# Patient Record
Sex: MALE | Race: WHITE
[De-identification: names, ages, dates, MRNs, and addresses within clinical notes are randomized per-mention and may not be internally consistent; named-entity substitution may affect disease eponyms.]

---

## 2020-03-09 ENCOUNTER — HOSPITAL ENCOUNTER (EMERGENCY)
Dept: HOSPITAL 97 - ER | Age: 25
LOS: 1 days | Discharge: HOME | End: 2020-03-10
Payer: COMMERCIAL

## 2020-03-09 DIAGNOSIS — J40: Primary | ICD-10-CM

## 2020-03-09 PROCEDURE — 99284 EMERGENCY DEPT VISIT MOD MDM: CPT

## 2020-03-09 PROCEDURE — 87804 INFLUENZA ASSAY W/OPTIC: CPT

## 2020-03-09 PROCEDURE — 93005 ELECTROCARDIOGRAM TRACING: CPT

## 2020-03-09 PROCEDURE — 94640 AIRWAY INHALATION TREATMENT: CPT

## 2020-03-09 NOTE — XMS REPORT
Summary of Care

 Created on:2020



Patient:Dino Escobedo

Sex:Male

:1995

External Reference #:[id number]





Demographics







 Address  [address] [place] [address]

 

 Home Phone  [phone]

 

 Mobile Phone  [phone]

 

 Email Address  [email or web address]

 

 Email Address  [email or web address]

 

 Preferred Language  English

 

 Marital Status  

 

 Evangelical Affiliation  Unknown

 

 Race  White

 

 Ethnic Group  Not  or 









Author







 Organization  [facility]

 

 Address  [address] [place] [address]









Support







 Name  Relationship  Address  Phone

 

 Belkis Escobedo  Unavailable  Unavailable  [phone]









Care Team Providers







 Name  Role  Phone

 

 Vero Verduzco  Primary Care Provider  [phone]









Encounter Details







 Date  Type  Department  Care Team  Description

 

 2020  Hospital Encounter  [facility]  Vero Verduzco



  Canceled (ERROR)



     and Breast Imaging,  [address]



  



     floor



  [place] [address]



  [id number] [phone]  



       (Fax)  







Allergies

Not on Filedocumented as of this encounter (statuses as of 2020)



Medications

Not on filedocumented as of this encounter (statuses as of 2020)



Active Problems

Not on filedocumented as of this encounter (statuses as of 2020)



Social History







 Tobacco Use  Types  Packs/Day  Years Used  Date

 

 Never Assessed        









 Sex Assigned at Birth  Date Recorded

 

 Not on file  









 Job Start Date  Occupation  Industry

 

 Not on file  Not on file  Not on file









 Travel History  Travel Start  Travel End









 No recent travel history available.



documented as of this encounter



Last Filed Vital Signs

Not on filedocumented in this encounter



Plan of Treatment







 Health Maintenance  Due Date  Last Done  Comments

 

 VARICELLA VACCINES ( of  -  10/31/1994    



 2-dose childhood series)      

 

 DTaP,Tdap,and Td Vaccines ( -  10/31/2004    



 Tdap)      

 

 INFLUENZA VACCINE (#1)  2019    

 

 HPV VACCINES  Aged Out    No longer eligible based on



       patient's age to complete this



       topic

 

 PNEUMOCOCCAL 0-64 YEARS COMBINED  Aged Out    No longer eligible based on



 SERIES      patient's age to complete this



       topic



documented as of this encounter



Procedures







 Procedure Name  Priority  Date/Time  Associated Diagnosis  Comments

 

 XR SINUSES 3+ VW  Routine  2020  3:26 PM  Chronic maxillary  Results for 
this



     CST  sinusitis  procedure are in



         the results



         section.

 

 XR CHEST 2 VW  Routine  2020  3:26 PM  Shortness of breath  Results for 
this



     CST    procedure are in



         the results



         section.



documented in this encounter



Results

XR SINUSES 3+ VW (2020  3:26 PM CST)





 Specimen

 

 









 Narrative  Performed At

 

 This result has an attachment that is not available.



HISTORY: Chronic maxillary sinusitis.  PACS/VR/DOSE



   



 COMPARISON: None.  



   



 TECHNIQUE: AP, right lateral and Woo views are submitted for paranasal  



 sinus evaluation.  



   



 FINDINGS: All paranasal sinuses appear clear. No significant deviation of  



 the nasal septum noted. No gross pathology visualized in the temporal bone  



 anatomy or in the sella turcica region.  



   



 CONCLUSIONS: Clear paranasal sinuses.  



   









 Procedure Note

 

 [facility], Radiant Results Inft User - 2020  3:32 PM CST



HISTORY: Chronic maxillary sinusitis.



 



 COMPARISON: None.



 



 TECHNIQUE: AP, right lateral and Woo views are submitted for paranasal



 sinus evaluation.



 



 FINDINGS: All paranasal sinuses appear clear. No significant deviation of



 the nasal septum noted. No gross pathology visualized in the temporal bone



 anatomy or in the sella turcica region.



 



 CONCLUSIONS: Clear paranasal sinuses.









 Performing Organization  Address  City/State/Zipcode  Phone Number

 

 PACS/VR/DOSE      



XR CHEST 2 VW (2020  3:26 PM CST)





 Specimen

 

 









 Narrative  Performed At

 

 This result has an attachment that is not available.



HISTORY: SOB.  PACS/VR/DOSE



   



 TECHNIQUE: PA and lateral views of the chest are obtained. No prior chest  



 study available for comparison. Lateral view is compromised due to motion.  



   



 FINDINGS: Minimal congestion suspected in the retrocardiac left lower lung.  



 No acute pneumonia detected. No pneumothorax or pleural effusion.  



 Cardiothoracic ratio of approximately 15/33.8 cm is consistent with normal  



 cardiac size.  



   



 CONCLUSIONS: Minimal congestion in the retrocardiac left lower lung,  



 borderline findings for viral infection/bronchitis. No pneumonia.  









 Procedure Note

 

 [facility], Radiant Results Inft User - 2020  3:31 PM CST



HISTORY: SOB.



 



 TECHNIQUE: PA and lateral views of the chest are obtained. No prior chest



 study available for comparison. Lateral view is compromised due to motion.



 



 FINDINGS: Minimal congestion suspected in the retrocardiac left lower lung.



 No acute pneumonia detected. No pneumothorax or pleural effusion.



 Cardiothoracic ratio of approximately 15/33.8 cm is consistent with normal



 cardiac size.



 



 CONCLUSIONS: Minimal congestion in the retrocardiac left lower lung,



 borderline findings for viral infection/bronchitis. No pneumonia.









 Performing Organization  Address  City/State/Zipcode  Phone Number

 

 PACS/VR/DOSE      



documented in this encounter



Visit Diagnoses







 Diagnosis

 

 Shortness of breath



documented in this encounter



Insurance







 Payer  Benefit Plan / Group  Subscriber ID  Effective Dates  Phone  Address  
Type

 

 EDIS RANDHAWA II  [id number]  2020-Present      HMO/PPO/POS









 Guarantor Name  Account Type  Relation to  Date of  Phone  Billing Address



     Patient  Birth    

 

 Dino Escobedo  Personal/Famil  Self  1995  [phone]  [address]



   y      (Home)  [address]



           [address]



documented as of this encounter

## 2020-03-09 NOTE — XMS REPORT
Summary of Care

 Created on:2020



Patient:Dino Escobedo

Sex:Male

:1995

External Reference #:[id number]





Demographics







 Address  [address] [place] [address]

 

 Home Phone  [phone]

 

 Mobile Phone  [phone]

 

 Email Address  [email or web address]

 

 Email Address  [email or web address]

 

 Preferred Language  English

 

 Marital Status  

 

 Christian Affiliation  Unknown

 

 Race  White

 

 Ethnic Group  Not  or 









Author







 Organization  [facility]

 

 Address  [address] [place] [address]









Support







 Name  Relationship  Address  Phone

 

 Belkis Escobedo  Unavailable  Unavailable  [phone]









Care Team Providers







 Name  Role  Phone

 

 Vero Verduzco  Primary Care Provider  [phone]









Encounter Details







 Date  Type  Department  Care Team  Description

 

 2020  Hospital Encounter  [facility]



     [facility]



  [address] [facility]



  



     [address] [facility] 



  [address] [address]



  [place]  



     [phone] [address] [phone] (Fax)  







Allergies

Not on Filedocumented as of this encounter (statuses as of 2020)



Medications

Not on filedocumented as of this encounter (statuses as of 2020)



Active Problems

Not on filedocumented as of this encounter (statuses as of 2020)



Social History







 Tobacco Use  Types  Packs/Day  Years Used  Date

 

 Never Assessed        









 Sex Assigned at Birth  Date Recorded

 

 Not on file  









 Job Start Date  Occupation  Industry

 

 Not on file  Not on file  Not on file









 Travel History  Travel Start  Travel End









 No recent travel history available.



documented as of this encounter



Last Filed Vital Signs

Not on filedocumented in this encounter



Plan of Treatment







 Health Maintenance  Due Date  Last Done  Comments

 

 VARICELLA VACCINES (1 of 2 -  10/31/1994    



 2-dose childhood series)      

 

 DTaP,Tdap,and Td Vaccines ( -  10/31/2004    



 Tdap)      

 

 INFLUENZA VACCINE (#1)  2019    

 

 HPV VACCINES  Aged Out    No longer eligible based on



       patient's age to complete this



       topic

 

 PNEUMOCOCCAL 0-64 YEARS COMBINED  Aged Out    No longer eligible based on



 SERIES      patient's age to complete this



       topic



documented as of this encounter



Procedures







 Procedure Name  Priority  Date/Time  Associated Diagnosis  Comments

 

 XR CHEST 2 VW  Routine  2020  3:26 PM  Shortness of breath  Results for 
this



     CST    procedure are in



         the results



         section.

 

 XR SINUSES 3+ VW  Routine  2020  3:26 PM  Chronic maxillary  Results for 
this



     CST  sinusitis  procedure are in



         the results



         section.



documented in this encounter



Results

XR SINUSES 3+ VW (2020  3:26 PM CST)





 Specimen

 

 









 Narrative  Performed At

 

 This result has an attachment that is not available.



HISTORY: Chronic maxillary sinusitis.  PACS/VR/DOSE



   



 COMPARISON: None.  



   



 TECHNIQUE: AP, right lateral and Woo views are submitted for paranasal  



 sinus evaluation.  



   



 FINDINGS: All paranasal sinuses appear clear. No significant deviation of  



 the nasal septum noted. No gross pathology visualized in the temporal bone  



 anatomy or in the sella turcica region.  



   



 CONCLUSIONS: Clear paranasal sinuses.  



   









 Procedure Note

 

 [facility], Radiant Results Inft User - 2020  3:32 PM CST



HISTORY: Chronic maxillary sinusitis.



 



 COMPARISON: None.



 



 TECHNIQUE: AP, right lateral and Woo views are submitted for paranasal



 sinus evaluation.



 



 FINDINGS: All paranasal sinuses appear clear. No significant deviation of



 the nasal septum noted. No gross pathology visualized in the temporal bone



 anatomy or in the sella turcica region.



 



 CONCLUSIONS: Clear paranasal sinuses.









 Performing Organization  Address  City/State/Zipcode  Phone Number

 

 PACS/VR/DOSE      



documented in this encounter



Visit Diagnoses







 Diagnosis

 

 Chronic maxillary sinusitis



documented in this encounter



Insurance







 Payer  Benefit Plan / Group  Subscriber ID  Effective Dates  Phone  Address  
Type

 

 EDIS RANDHAWA II  [id number]  2020-Present      HMO/PPO/POS









 Guarantor Name  Account Type  Relation to  Date of  Phone  Billing Address



     Patient  Birth    

 

 Dino Escobedo  Personal/Famil  Self  1995  [phone]  [address]



   y      (Home)  [address]



           [address]



documented as of this encounter

## 2020-03-09 NOTE — XMS REPORT
Patient Summary Document

 Created on:2020



Patient:PONCE TOMAS HOMER

Sex:Male

:1995

External Reference #:[id number]





Demographics







 Address  [address]



   [place] [address]

 

 Home Phone  [phone]

 

 Work Phone  [phone]

 

 Email Address  [email or web address]

 

 Preferred Language  Unknown

 

 Marital Status  Unknown

 

 Hoahaoism Affiliation  Unknown

 

 Race  Unknown

 

 Additional Race(s)  Unavailable

 

 Ethnic Group  Unknown









Author







 Organization  [facility]

 

 Address  [address]



   [place] [address]

 

 Phone  [phone]









Care Team Providers







 Name  Role  Phone

 

 Unavailable  Unavailable  Unavailable









Problems

This patient has no known problems.



Allergies, Adverse Reactions, Alerts

This patient has no known allergies or adverse reactions.



Medications

This patient has no known medications.

## 2020-03-10 VITALS — TEMPERATURE: 98 F | SYSTOLIC BLOOD PRESSURE: 125 MMHG | DIASTOLIC BLOOD PRESSURE: 70 MMHG

## 2020-03-10 VITALS — OXYGEN SATURATION: 99 %

## 2020-03-10 NOTE — ER
Nurse's Notes                                                                                     

 [facility]                                                                 

Name: Dino Escobedo                                                                             

Age: 24 yrs                                                                                       

Sex: Male                                                                                         

: 1995                                                                                   

MRN: [id number]                                                                                   

Arrival Date: 2020                                                                          

Time: 22:10                                                                                       

Account#: [id number]                                                                            

Bed 26                                                                                            

Private MD:                                                                                       

Diagnosis: Bronchitis, not specified as acute or chronic                                          

                                                                                                  

Presentation:                                                                                     

                                                                                             

22:43 Chief complaint: Patient states: he is having difficulty breathing was seen by PCP      nir  

      several times and diagnosed with something different each time the last time he had an      

      X-ray and was told he had bronchitis and given a Zpak but symptoms still persist.           

      Coronavirus screen: The patient has NOT traveled to a country currently being monitored     

      by the CDC within the last 14 days. Proceed with normal triage procedures. Ebola            

      Screen: No symptoms or risks identified at this time. Initial Sepsis Screen: Does the       

      patient meet any 2 criteria? No. Patient's initial sepsis screen is negative. Does the      

      patient have a suspected source of infection? No. Patient's initial sepsis screen is        

      negative. Risk Assessment: Do you want to hurt yourself or someone else? Patient            

      reports no desire to harm self or others. Onset of symptoms was 2019.              

22:43 Method Of Arrival: Ambulatory                                                           bb  

22:43 Acuity: ROSS 3                                                                           bb  

                                                                                                  

Triage Assessment:                                                                                

22:46 General: Appears in no apparent distress. Behavior is calm, cooperative. Pain: Denies   bb  

      pain. Respiratory: Reports shortness of breath Respiratory effort is even, unlabored,       

      Onset: The symptoms/episode began/occurred since Dec 2019, the patient has mild             

      shortness of breath. Derm: Skin is pink, warm \T\ dry. Musculoskeletal: Circulation,        

      motion, and sensation intact.                                                               

                                                                                                  

Historical:                                                                                       

- Allergies:                                                                                      

22:46 No Known Allergies;                                                                     bb  

- Home Meds:                                                                                      

22:46 None [Active];                                                                          bb  

- PMHx:                                                                                           

22:46 None;                                                                                   bb  

- PSHx:                                                                                           

22:46 None;                                                                                   bb  

                                                                                                  

- Immunization history:: Adult Immunizations up to date.                                          

- Social history:: Smoking status: Patient denies any tobacco usage or history of.                

                                                                                                  

                                                                                                  

Screenin/10                                                                                             

01:07 Abuse screen: Denies threats or abuse. Nutritional screening: No deficits noted.        ll1 

      Tuberculosis screening: No symptoms or risk factors identified. Fall Risk None              

      identified. Total Edwards Fall Scale indicates No Risk (0-24 pts).                            

                                                                                                  

Assessment:                                                                                       

                                                                                             

23:45 General: Appears in no apparent distress. Behavior is calm, cooperative.                ll1 

      Cardiovascular: Heart tones S1 S2 Capillary refill < 3 seconds Clubbing of nail beds is     

      absent JVD is absent Patient's skin is warm and dry. Pulses are 2+ in right radial          

      artery and left radial artery Rhythm is regular Chest pain is aggravated by coughing.       

      Respiratory: Reports shortness of breath at rest Airway is patent Trachea midline           

      Respiratory effort is even, unlabored, Respiratory pattern is regular, symmetrical,         

      Breath sounds with wheezes bilaterally.                                                     

03/10                                                                                             

00:45 Reassessment: Patient and/or family updated on plan of care and expected duration. Pain ll1 

      level reassessed. Patient is alert, oriented x 3, equal unlabored respirations, skin        

      warm/dry/pink. states he cannot tell if the SOB is better yet.                              

01:15 Reassessment: Patient appears in no apparent distress at this time. No changes from     ll1 

      previously documented assessment. Patient and/or family updated on plan of care and         

      expected duration. Pain level reassessed. Patient is alert, oriented x 3, equal             

      unlabored respirations, skin warm/dry/pink.                                                 

                                                                                                  

Vital Signs:                                                                                      

                                                                                             

22:43  / 81; Pulse 58; Resp 18 S; Temp 97.7(O); Pulse Ox 99% on R/A; Weight 108.86 kg   bb  

      (R); Height 6 ft. 0 in. (182.88 cm) (R); Pain 0/10;                                         

03/10                                                                                             

01:25  / 70; Pulse 60; Resp 18; Temp 98.0; Pulse Ox 99% ; Pain 0/10;                    ll1 

                                                                                             

22:43 Body Mass Index 32.55 (108.86 kg, 182.88 cm)                                              

                                                                                                  

ED Course:                                                                                        

                                                                                             

22:10 Patient arrived in ED.                                                                  es  

22:46 Triage completed.                                                                       bb  

22:46 Arm band placed on Patient placed in waiting room, Patient notified of wait time.       bb  

      Family accompanied patient.                                                                 

23:12 Chito Weston MD is Attending Physician.                                            tw4 

23:31 Mariaelena Lucas, RN is Primary Nurse.                                                     ll1 

03/10                                                                                             

01:08 Patient has correct armband on for positive identification. Bed in low position. Call   ll1 

      light in reach. Side rails up X 1.                                                          

01:08 No provider procedures requiring assistance completed.                                  ll1 

01:27 Patient did not have IV access during this emergency room visit.                        1 

                                                                                                  

Administered Medications:                                                                         

                                                                                             

23:45 Drug: DuoNeb (3:1) (2.5 mg - 0.5 mg) 3 ml Route: Nebulizer;                             ll1 

03/10                                                                                             

01:01 Follow up: Response: No adverse reaction; Wheezing diminished; RASS: Alert and Calm (0) [facility] 

                                                                                                  

                                                                                                  

Outcome:                                                                                          

01:06 Discharge ordered by MD.                                                                tw4 

01:27 Discharged to home ambulatory, with family.                                             1 

01:27 Condition: good                                                                             

01:27 Discharge instructions given to patient, Instructed on discharge instructions, follow       

      up and referral plans. medication usage, Demonstrated understanding of instructions,        

      follow-up care, medications, Prescriptions given X 2.                                       

01:30 Patient left the ED.                                                                    1 

                                                                                                  

Signatures:                                                                                       

Jesusita Sanchez Brenda, SAADIA                     RN   Chito Jasso MD MD   tw4                                                  

Mariaelena Lucas RN                       RN   [facility]                                                  

                                                                                                  

**************************************************************************************************

## 2020-03-10 NOTE — EKG
Test Date:    2020-03-09               Test Time:    23:56:39

Technician:   ANAM                                    

                                                     

MEASUREMENT RESULTS:                                       

Intervals:                                           

Rate:         64                                     

MI:           164                                    

QRSD:         122                                    

QT:           424                                    

QTc:          437                                    

Axis:                                                

P:            38                                     

MI:           164                                    

QRS:          21                                     

T:            49                                     

                                                     

INTERPRETIVE STATEMENTS:                                       

                                                     

Normal sinus rhythm

Nonspecific intraventricular conduction delay

Borderline ECG

No previous ECG available for comparison



Electronically Signed On 03-10-20 12:12:07 CDT by Jacob Hassan

## 2020-03-10 NOTE — EDPHYS
Physician Documentation                                                                           

 [facility]                                                                 

Name: Dino Escobedo                                                                             

Age: 24 yrs                                                                                       

Sex: Male                                                                                         

: 1995                                                                                   

MRN: [id number]                                                                                   

Arrival Date: 2020                                                                          

Time: 22:10                                                                                       

Account#: [id number]                                                                            

Bed 26                                                                                            

Private MD:                                                                                       

ED Physician Chito Weston                                                                     

HPI:                                                                                              

03/10                                                                                             

00:37 This 24 yrs old  Male presents to ER via Ambulatory with complaints of         tw4 

      Breathing Difficulty.                                                                       

00:37 The patient has shortness of breath at rest. Onset: The symptoms/episode began/occurred tw4 

      2 month(s) ago. Duration: The symptoms are continuous, and are unchanged since they         

      started. The patient's shortness of breath has no apparent modifying factors.               

      Associated signs and symptoms: The patient has no apparent associated signs or              

      symptoms. Severity of symptoms: At their worst the symptoms were moderate in the            

      emergency department the symptoms have resolved. The patient has not experienced            

      similar symptoms in the past.                                                               

                                                                                                  

Historical:                                                                                       

- Allergies:                                                                                      

                                                                                             

22:46 No Known Allergies;                                                                     bb  

- Home Meds:                                                                                      

22:46 None [Active];                                                                          bb  

- PMHx:                                                                                           

22:46 None;                                                                                   bb  

- PSHx:                                                                                           

22:46 None;                                                                                   bb  

                                                                                                  

- Immunization history:: Adult Immunizations up to date.                                          

- Social history:: Smoking status: Patient denies any tobacco usage or history of.                

                                                                                                  

                                                                                                  

ROS:                                                                                              

03/10                                                                                             

00:37 Constitutional: Negative for fever, chills, and weight loss, Cardiovascular: Negative   tw4 

      for chest pain, palpitations, and edema, Abdomen/GI: Negative for abdominal pain,           

      nausea, vomiting, diarrhea, and constipation, Back: Negative for injury and pain,           

      MS/Extremity: Negative for injury and deformity, Skin: Negative for injury, rash, and       

      discoloration, Neuro: Negative for headache, weakness, numbness, tingling, and seizure.     

                                                                                                  

Exam:                                                                                             

00:37 Constitutional:  This is a well developed, well nourished patient who is awake, alert,  tw4 

      and in no acute distress. Head/Face:  Normocephalic, atraumatic. Chest/axilla:  Normal      

      chest wall appearance and motion.  Nontender with no deformity.  No lesions are             

      appreciated. Cardiovascular:  Regular rate and rhythm with a normal S1 and S2.  No          

      gallops, murmurs, or rubs.  Normal PMI, no JVD.  No pulse deficits. Respiratory:  Lungs     

      have equal breath sounds bilaterally, clear to auscultation and percussion.  No rales,      

      rhonchi or wheezes noted.  No increased work of breathing, no retractions or nasal          

      flaring. Abdomen/GI:  Soft, non-tender, with normal bowel sounds.  No distension or         

      tympany.  No guarding or rebound.  No evidence of tenderness throughout. Back:  No          

      spinal tenderness.  No costovertebral tenderness.  Full range of motion. MS/ Extremity:     

       Pulses equal, no cyanosis.  Neurovascular intact.  Full, normal range of motion.           

      Neuro:  Awake and alert, GCS 15, oriented to person, place, time, and situation.            

      Cranial nerves II-XII grossly intact.  Motor strength 5/5 in all extremities.  Sensory      

      grossly intact.  Cerebellar exam normal.  Normal gait.                                      

                                                                                                  

Vital Signs:                                                                                      

                                                                                             

22:43  / 81; Pulse 58; Resp 18 S; Temp 97.7(O); Pulse Ox 99% on R/A; Weight 108.86 kg   bb  

      (R); Height 6 ft. 0 in. (182.88 cm) (R); Pain 0/10;                                         

03/10                                                                                             

01:25  / 70; Pulse 60; Resp 18; Temp 98.0; Pulse Ox 99% ; Pain 0/10;                    ll1 

                                                                                             

22:43 Body Mass Index 32.55 (108.86 kg, 182.88 cm)                                            bb  

                                                                                                  

MDM:                                                                                              

                                                                                             

23:27 Patient medically screened.                                                             tw4 

03/10                                                                                             

00:37 Differential diagnosis: asthma, Bronchitis pneumonia, reactive airway disease. Data     tw4 

      reviewed: vital signs, nurses notes. Data reviewed: lab test result(s), Flu: negative.      

      Data interpreted: Pulse oximetry: Interpretation: normal. Counseling: I had a detailed      

      discussion with the patient and/or guardian regarding: the historical points, exam          

      findings, and any diagnostic results supporting the discharge/admit diagnosis, lab          

      results. Special discussion: I discussed with the patient/guardian in detail that at        

      this point there is no indication for admission to the hospital. It is understood,          

      however, that if the symptoms persist or worsen the patient needs to return immediately     

      for re-evaluation.                                                                          

                                                                                                  

                                                                                             

23:30 Order name: Flu                                                                         tw4 

03/10                                                                                             

00:37 Order name: EKG - Nurse/Tech; Complete Time: :                                      tw4 

                                                                                                  

Administered Medications:                                                                         

03/09                                                                                             

23:45 Drug: DuoNeb (3:1) (2.5 mg - 0.5 mg) 3 ml Route: Nebulizer;                             ll1 

03/10                                                                                             

01:01 Follow up: Response: No adverse reaction; Wheezing diminished; RASS: Alert and Calm (0) ll1 

                                                                                                  

                                                                                                  

Disposition:                                                                                      

03/10/20 01:06 Discharged to Home. Impression: Bronchitis, not specified as acute or chronic.     

- Condition is Stable.                                                                            

- Discharge Instructions: Acute Bronchitis, Adult, Shortness of Breath, Easy-to-Read.             

- Prescriptions for Albuterol Sulfate 90 mcg/actuation - inhale 1-2 puff by INHALATION            

  route every 4-6 hours; 1 Inhaler. Guaifenesin AC 10- 100 mg/5 mL Oral Liquid - take             

  10 milliliter by ORAL route every 4 hours As needed; 240 milliliter.                            

- Medication Reconciliation Form, Thank You Letter, Antibiotic Education, Prescription            

  Opioid Use form.                                                                                

- Follow up: Private Physician; When: Upon discharge from the Emergency Department;               

  Reason: Recheck today's complaints, Continuance of care, Re-evaluation by your                  

  physician.                                                                                      

- Problem is new.                                                                                 

- Symptoms have improved.                                                                         

                                                                                                  

                                                                                                  

                                                                                                  

Signatures:                                                                                       

Dispatcher [employer]                           EDMS                                                 

Alta Key RN                     RN   bb                                                   

Chito Weston MD MD   tw4                                                  

Mariaelena Lucas RN                       RN   ll1                                                  

                                                                                                  

Corrections: (The following items were deleted from the chart)                                    

01:30 01:06 03/10/2020 01:06 Discharged to Home. Impression: Bronchitis, not specified as     ll1 

      acute or chronic. Condition is Stable. Forms are Medication Reconciliation Form, Thank      

      You Letter, Antibiotic Education, Prescription Opioid Use. Follow up: Private               

      Physician; When: Upon discharge from the Emergency Department; Reason: Recheck today's      

      complaints, Continuance of care, Re-evaluation by your physician. Problem is new.           

      Symptoms have improved. tw4                                                                 

                                                                                                  

**************************************************************************************************